# Patient Record
Sex: FEMALE | Race: WHITE | NOT HISPANIC OR LATINO | Employment: OTHER | URBAN - METROPOLITAN AREA
[De-identification: names, ages, dates, MRNs, and addresses within clinical notes are randomized per-mention and may not be internally consistent; named-entity substitution may affect disease eponyms.]

---

## 2018-02-27 ENCOUNTER — APPOINTMENT (OUTPATIENT)
Dept: RADIOLOGY | Facility: IMAGING CENTER | Age: 63
End: 2018-02-27
Attending: PHYSICIAN ASSISTANT
Payer: COMMERCIAL

## 2018-02-27 ENCOUNTER — OFFICE VISIT (OUTPATIENT)
Dept: URGENT CARE | Facility: CLINIC | Age: 63
End: 2018-02-27
Payer: COMMERCIAL

## 2018-02-27 VITALS
BODY MASS INDEX: 28.85 KG/M2 | WEIGHT: 169 LBS | DIASTOLIC BLOOD PRESSURE: 90 MMHG | TEMPERATURE: 97.8 F | OXYGEN SATURATION: 91 % | SYSTOLIC BLOOD PRESSURE: 130 MMHG | HEART RATE: 64 BPM | HEIGHT: 64 IN

## 2018-02-27 DIAGNOSIS — R05.9 COUGH: ICD-10-CM

## 2018-02-27 DIAGNOSIS — J40 BRONCHITIS: ICD-10-CM

## 2018-02-27 PROCEDURE — 99204 OFFICE O/P NEW MOD 45 MIN: CPT | Performed by: PHYSICIAN ASSISTANT

## 2018-02-27 PROCEDURE — 71046 X-RAY EXAM CHEST 2 VIEWS: CPT | Mod: TC,FY | Performed by: PHYSICIAN ASSISTANT

## 2018-02-27 RX ORDER — DOXYCYCLINE HYCLATE 100 MG/1
100 CAPSULE ORAL 2 TIMES DAILY
Qty: 14 EACH | Refills: 0 | Status: SHIPPED | OUTPATIENT
Start: 2018-02-27 | End: 2018-03-06

## 2018-02-27 RX ORDER — BENZONATATE 200 MG/1
200 CAPSULE ORAL 3 TIMES DAILY PRN
Qty: 30 CAP | Refills: 0 | Status: SHIPPED | OUTPATIENT
Start: 2018-02-27 | End: 2020-11-17

## 2018-02-27 RX ORDER — VENLAFAXINE HYDROCHLORIDE 150 MG/1
CAPSULE, EXTENDED RELEASE ORAL
COMMUNITY
Start: 2018-02-20 | End: 2020-11-17

## 2018-02-27 ASSESSMENT — ENCOUNTER SYMPTOMS
HEMOPTYSIS: 0
SHORTNESS OF BREATH: 1
SPUTUM PRODUCTION: 1
COUGH: 1
FEVER: 0
SORE THROAT: 1
PALPITATIONS: 0
WHEEZING: 1
CHILLS: 1

## 2018-02-27 ASSESSMENT — PAIN SCALES - GENERAL: PAINLEVEL: NO PAIN

## 2018-02-27 NOTE — PROGRESS NOTES
Subjective:      Dina Leblanc is a 62 y.o. female who presents with Cough (x 3 days productive); Nasal Congestion (x 3 days); and Pharyngitis            Cough   This is a new problem. Episode onset: 2 weeks ago. The problem has been unchanged. The cough is productive of sputum. Associated symptoms include chills, a sore throat, shortness of breath and wheezing. Pertinent negatives include no chest pain, ear pain, fever or hemoptysis. The symptoms are aggravated by lying down. She has tried OTC cough suppressant for the symptoms.       Review of Systems   Constitutional: Positive for chills and malaise/fatigue. Negative for fever.   HENT: Positive for congestion and sore throat. Negative for ear pain.    Respiratory: Positive for cough, sputum production, shortness of breath and wheezing. Negative for hemoptysis.    Cardiovascular: Negative for chest pain and palpitations.   All other systems reviewed and are negative.    PMH:  has no past medical history on file.  MEDS:   Current Outpatient Prescriptions:   •  progesterone (PROMETRIUM) 100 MG Cap, , Disp: , Rfl:   •  venlafaxine (EFFEXOR-XR) 150 MG extended-release capsule, , Disp: , Rfl:   •  doxycycline (VIBRAMYCIN) 100 MG Cap, Take 1 Cap by mouth 2 times a day for 7 days., Disp: 14 Each, Rfl: 0  •  benzonatate (TESSALON) 200 MG capsule, Take 1 Cap by mouth 3 times a day as needed for Cough., Disp: 30 Cap, Rfl: 0  ALLERGIES:   Allergies   Allergen Reactions   • Codeine Vomiting     Pt reported     SURGHX: History reviewed. No pertinent surgical history.  SOCHX:  reports that she quit smoking about 10 years ago. Her smoking use included Cigarettes. She quit after 10.00 years of use. She has never used smokeless tobacco. She reports that she drinks alcohol. She reports that she does not use drugs.  FH: Family history was reviewed, no pertinent findings to report  Medications, Allergies, and current problem list reviewed today in Epic       Objective:     /90    "Pulse 64   Temp 36.6 °C (97.8 °F)   Ht 1.626 m (5' 4\")   Wt 76.7 kg (169 lb)   SpO2 91%   BMI 29.01 kg/m²      Physical Exam   Constitutional: She is oriented to person, place, and time. She appears well-developed and well-nourished. She is active.  Non-toxic appearance. She does not have a sickly appearance. She does not appear ill. No distress. She is not intubated.   HENT:   Head: Normocephalic and atraumatic.   Right Ear: Hearing, tympanic membrane, external ear and ear canal normal.   Left Ear: Hearing, tympanic membrane, external ear and ear canal normal.   Nose: Nose normal.   Mouth/Throat: Uvula is midline, oropharynx is clear and moist and mucous membranes are normal.   Eyes: Conjunctivae, EOM and lids are normal.   Neck: Normal range of motion. Neck supple.   Cardiovascular: Regular rhythm, S1 normal, S2 normal and normal heart sounds.  Exam reveals no gallop and no friction rub.    No murmur heard.  Pulmonary/Chest: Effort normal. No accessory muscle usage. No apnea, no tachypnea and no bradypnea. She is not intubated. No respiratory distress. She has no decreased breath sounds. She has no wheezes. She has no rhonchi. She has no rales. She exhibits no tenderness.   Coarse lung sounds   Musculoskeletal: Normal range of motion.   Neurological: She is alert and oriented to person, place, and time.   Skin: Skin is warm and dry.   Psychiatric: She has a normal mood and affect. Her speech is normal and behavior is normal. Judgment and thought content normal.   Vitals reviewed.           2/27/2018 11:17 AM    HISTORY/REASON FOR EXAM:  Cough for one week.    TECHNIQUE/EXAM DESCRIPTION AND NUMBER OF VIEWS:  Two views of the chest.    COMPARISON: None.    FINDINGS:  There is linear atelectasis within the lung bases.  The heart is normal in size.  There is no evidence of pleural effusion.  Soft tissues and bony structures are unremarkable.     Impression       Linear atelectasis within the lung bases.      "   Assessment/Plan:     1. Bronchitis  DX-CHEST-2 VIEWS    doxycycline (VIBRAMYCIN) 100 MG Cap    benzonatate (TESSALON) 200 MG capsule     Differential diagnosis, natural history, supportive care discussed. Follow-up with primary care provider within 7-10 days, emergency room precautions discussed.  Patient and/or family appears understanding of information.

## 2018-04-30 ENCOUNTER — APPOINTMENT (OUTPATIENT)
Dept: RADIOLOGY | Facility: IMAGING CENTER | Age: 63
End: 2018-04-30
Attending: NURSE PRACTITIONER
Payer: COMMERCIAL

## 2018-04-30 ENCOUNTER — OFFICE VISIT (OUTPATIENT)
Dept: URGENT CARE | Facility: CLINIC | Age: 63
End: 2018-04-30
Payer: COMMERCIAL

## 2018-04-30 VITALS
OXYGEN SATURATION: 97 % | RESPIRATION RATE: 16 BRPM | WEIGHT: 172 LBS | BODY MASS INDEX: 29.37 KG/M2 | TEMPERATURE: 98 F | SYSTOLIC BLOOD PRESSURE: 120 MMHG | HEART RATE: 84 BPM | HEIGHT: 64 IN | DIASTOLIC BLOOD PRESSURE: 80 MMHG
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
## 2018-04-30 DIAGNOSIS — S99.929A: ICD-10-CM

## 2018-04-30 PROCEDURE — 73630 X-RAY EXAM OF FOOT: CPT | Mod: TC,FY,RT | Performed by: NURSE PRACTITIONER

## 2018-04-30 PROCEDURE — 99214 OFFICE O/P EST MOD 30 MIN: CPT | Mod: 25 | Performed by: NURSE PRACTITIONER

## 2018-04-30 PROCEDURE — 90471 IMMUNIZATION ADMIN: CPT | Performed by: NURSE PRACTITIONER

## 2018-04-30 PROCEDURE — 90715 TDAP VACCINE 7 YRS/> IM: CPT | Performed by: NURSE PRACTITIONER

## 2018-04-30 RX ORDER — DOXYCYCLINE HYCLATE 100 MG
100 TABLET ORAL 2 TIMES DAILY
Qty: 14 TAB | Refills: 0 | Status: SHIPPED | OUTPATIENT
Start: 2018-04-30 | End: 2018-05-07

## 2018-04-30 ASSESSMENT — ENCOUNTER SYMPTOMS: FEVER: 0

## 2018-04-30 NOTE — PROGRESS NOTES
"Subjective:      Dina Leblanc is a 62 y.o. female who presents with Puncture Wound (Last night tepped on nail with right foot )            Puncture Wound    Incident onset: pt reports she was in a friend's yard last night walking through tall grass when she stepped on a nail in a board. She reports the nail went completely through her shoe and into her foot, puncturing the base of her right great toe. The laceration mechanism was a nail. The pain is mild. The pain has been constant since onset. She reports no foreign bodies present. Her tetanus status is out of date.       Review of Systems   Constitutional: Negative for fever.   Musculoskeletal:        Puncture wound with nail right foot   All other systems reviewed and are negative.    No past medical history on file. No past surgical history on file.   Social History     Social History   • Marital status: Single     Spouse name: N/A   • Number of children: N/A   • Years of education: N/A     Occupational History   • Not on file.     Social History Main Topics   • Smoking status: Former Smoker     Years: 10.00     Types: Cigarettes     Quit date: 2/27/2008   • Smokeless tobacco: Never Used   • Alcohol use Yes      Comment: Occasional   • Drug use: No   • Sexual activity: Not on file     Other Topics Concern   • Not on file     Social History Narrative   • No narrative on file          Objective:     /80   Pulse 84   Temp 36.7 °C (98 °F)   Resp 16   Ht 1.626 m (5' 4\")   Wt 78 kg (172 lb)   SpO2 97%   BMI 29.52 kg/m²      Physical Exam   Constitutional: She is oriented to person, place, and time. Vital signs are normal. She appears well-developed and well-nourished.   HENT:   Head: Normocephalic and atraumatic.   Eyes: EOM are normal. Pupils are equal, round, and reactive to light.   Neck: Normal range of motion.   Cardiovascular: Normal rate and regular rhythm.    Pulmonary/Chest: Effort normal.   Musculoskeletal: Normal range of motion.        Right " foot: There is tenderness.        Feet:    Neurological: She is alert and oriented to person, place, and time.   Skin: Skin is warm and dry. Capillary refill takes less than 2 seconds.   Psychiatric: She has a normal mood and affect. Her speech is normal and behavior is normal. Thought content normal.   Vitals reviewed.         Xray: no fracture or dislocation by my read. Radiology review pending.  4/30/2018 8:29 AM    HISTORY/REASON FOR EXAM:  Right first MTP joint pain. Stepped on a nail.    TECHNIQUE/EXAM DESCRIPTION AND NUMBER OF VIEWS:  3 nonweightbearing views of the RIGHT foot.    COMPARISON:    FINDINGS: Bone mineralization is normal. There is no evidence of fracture or dislocation. Soft tissues are normal. There is a plantar calcaneal spur.   Impression       No evidence of acute fracture or dislocation.          Assessment/Plan:     1. Injury by nail, initial encounter  - DX-FOOT-COMPLETE 3+ RIGHT; Future  - Tdap =>6yo IM    2. Soft tissue injury of foot, initial encounter  - doxycycline (VIBRAMYCIN) 100 MG Tab; Take 1 Tab by mouth 2 times a day for 7 days.  Dispense: 14 Tab; Refill: 0    Discussed with patient there is no evidence of injury to her bone  Contingent antibiotic prescription given to patient to fill upon meeting criteria of guidelines discussed.   Keep wound clean and dry  Alternate tylenol and ibuprofen PRN pain  Supportive care, differential diagnoses, and indications for immediate follow-up discussed with patient.    Pathogenesis of diagnosis discussed including typical length and natural progression.      Instructed to return to  or nearest emergency department if symptoms fail to improve, for any change in condition, further concerns, or new concerning symptoms.  Patient states understanding of the plan of care and discharge instructions.

## 2020-11-17 ENCOUNTER — APPOINTMENT (OUTPATIENT)
Dept: RADIOLOGY | Facility: MEDICAL CENTER | Age: 65
End: 2020-11-17
Attending: EMERGENCY MEDICINE
Payer: MEDICAID

## 2020-11-17 ENCOUNTER — HOSPITAL ENCOUNTER (EMERGENCY)
Facility: MEDICAL CENTER | Age: 65
End: 2020-11-17
Attending: EMERGENCY MEDICINE
Payer: MEDICAID

## 2020-11-17 VITALS
SYSTOLIC BLOOD PRESSURE: 180 MMHG | WEIGHT: 190 LBS | DIASTOLIC BLOOD PRESSURE: 82 MMHG | BODY MASS INDEX: 32.44 KG/M2 | HEART RATE: 70 BPM | TEMPERATURE: 97.9 F | RESPIRATION RATE: 20 BRPM | OXYGEN SATURATION: 92 % | HEIGHT: 64 IN

## 2020-11-17 DIAGNOSIS — Z20.822 SUSPECTED COVID-19 VIRUS INFECTION: ICD-10-CM

## 2020-11-17 LAB
COVID ORDER STATUS COVID19: NORMAL
SARS-COV-2 RNA RESP QL NAA+PROBE: DETECTED
SPECIMEN SOURCE: ABNORMAL

## 2020-11-17 PROCEDURE — 700102 HCHG RX REV CODE 250 W/ 637 OVERRIDE(OP): Performed by: EMERGENCY MEDICINE

## 2020-11-17 PROCEDURE — 71045 X-RAY EXAM CHEST 1 VIEW: CPT

## 2020-11-17 PROCEDURE — A9270 NON-COVERED ITEM OR SERVICE: HCPCS | Performed by: EMERGENCY MEDICINE

## 2020-11-17 PROCEDURE — 99284 EMERGENCY DEPT VISIT MOD MDM: CPT

## 2020-11-17 PROCEDURE — U0003 INFECTIOUS AGENT DETECTION BY NUCLEIC ACID (DNA OR RNA); SEVERE ACUTE RESPIRATORY SYNDROME CORONAVIRUS 2 (SARS-COV-2) (CORONAVIRUS DISEASE [COVID-19]), AMPLIFIED PROBE TECHNIQUE, MAKING USE OF HIGH THROUGHPUT TECHNOLOGIES AS DESCRIBED BY CMS-2020-01-R: HCPCS

## 2020-11-17 RX ORDER — ACETAMINOPHEN 325 MG/1
975 TABLET ORAL ONCE
Status: COMPLETED | OUTPATIENT
Start: 2020-11-17 | End: 2020-11-17

## 2020-11-17 RX ORDER — DULOXETIN HYDROCHLORIDE 20 MG/1
20 CAPSULE, DELAYED RELEASE ORAL 2 TIMES DAILY
COMMUNITY
End: 2021-05-28

## 2020-11-17 RX ORDER — AMLODIPINE BESYLATE 5 MG/1
5 TABLET ORAL DAILY
Status: SHIPPED | COMMUNITY
End: 2022-03-17 | Stop reason: SDUPTHER

## 2020-11-17 RX ADMIN — ACETAMINOPHEN 975 MG: 325 TABLET, FILM COATED ORAL at 08:30

## 2020-11-17 NOTE — ED NOTES
Medicated per MAR,  covid swab obtained and sent.  Updated on POc.  Denies needs, call light in reach.

## 2020-11-17 NOTE — ED TRIAGE NOTES
"Chief Complaint   Patient presents with   • Body Aches   • Cough   • Sore Throat     BP (!) 176/101   Pulse 79   Temp 36.6 °C (97.8 °F) (Temporal)   Resp 18   Ht 1.626 m (5' 4\")   Wt 86.2 kg (190 lb)   SpO2 93%   Breastfeeding No   BMI 32.61 kg/m²     Pt ambulated into triage, mask in place, NAD. Pt states symptoms started yesterday. Encouraged to return to the triage nurse or tech with any new complaints or symptoms. Pt taken to separate lobby.  "

## 2020-11-17 NOTE — ED PROVIDER NOTES
ED Provider Note    CHIEF COMPLAINT  Chief Complaint   Patient presents with   • Body Aches   • Cough   • Sore Throat       HPI  Dina Leblanc is a 65 y.o. female who presents evaluation of body aches sore throat mild cough.  The patient apparently was recently exposed to her work with a COVID-19 positive individual.  She denies significant dyspnea or high fevers.  She has a history of hypertension she does not endorse chest pain.  She noted that her blood pressure was slightly high with checkup.  No associated numbness tingling weakness    REVIEW OF SYSTEMS  See HPI for further details.  Positive fevers chills body aches all other systems are negative.     PAST MEDICAL HISTORY  Past Medical History:   Diagnosis Date   • Hypertension        FAMILY HISTORY  Noncontributory    SOCIAL HISTORY  Social History     Socioeconomic History   • Marital status: Single     Spouse name: Not on file   • Number of children: Not on file   • Years of education: Not on file   • Highest education level: Not on file   Occupational History   • Not on file   Social Needs   • Financial resource strain: Not on file   • Food insecurity     Worry: Not on file     Inability: Not on file   • Transportation needs     Medical: Not on file     Non-medical: Not on file   Tobacco Use   • Smoking status: Former Smoker     Years: 10.00     Types: Cigarettes     Quit date: 2008     Years since quittin.7   • Smokeless tobacco: Never Used   Substance and Sexual Activity   • Alcohol use: Yes     Comment: Occasional   • Drug use: No   • Sexual activity: Not on file   Lifestyle   • Physical activity     Days per week: Not on file     Minutes per session: Not on file   • Stress: Not on file   Relationships   • Social connections     Talks on phone: Not on file     Gets together: Not on file     Attends [employer] service: Not on file     Active member of club or organization: Not on file     Attends meetings of clubs or organizations: Not on file      "Relationship status: Not on file   • Intimate partner violence     Fear of current or ex partner: Not on file     Emotionally abused: Not on file     Physically abused: Not on file     Forced sexual activity: Not on file   Other Topics Concern   • Not on file   Social History Narrative   • Not on file     Non-smoker  SURGICAL HISTORY  No past surgical history on file.    CURRENT MEDICATIONS  Home Medications     Reviewed by Nelsy Chacon R.N. (Registered Nurse) on 11/17/20 at 0727  Med List Status: Complete   Medication Last Dose Status   amLODIPine (NORVASC) 5 MG Tab 11/17/2020 Active   DULoxetine (CYMBALTA) 20 MG Cap DR Particles 11/17/2020 Active                ALLERGIES  Allergies   Allergen Reactions   • Codeine Vomiting     Pt reported       PHYSICAL EXAM  VITAL SIGNS: BP (!) 176/101   Pulse 79   Temp 36.6 °C (97.8 °F) (Temporal)   Resp 18   Ht 1.626 m (5' 4\")   Wt 86.2 kg (190 lb)   SpO2 93%   Breastfeeding No   BMI 32.61 kg/m²       Constitutional: Well developed, Well nourished, No acute distress, Non-toxic appearance.   HENT: Normocephalic, Atraumatic, Bilateral external ears normal, Oropharynx moist, No oral exudates, Nose normal.   Eyes: PERRLA, EOMI, Conjunctiva normal, No discharge.   Neck: Normal range of motion, No tenderness, Supple, No stridor.   Cardiovascular: Normal heart rate, Normal rhythm, No murmurs, No rubs, No gallops.   Thorax & Lungs: Minimal scattered rhonchi, No chest tenderness.   Abdomen: Bowel sounds normal, Soft, No tenderness, No masses, No pulsatile masses.   Skin: Warm, Dry, No erythema, No rash.   Back: No tenderness, No CVA tenderness.   Extremities: Intact distal pulses, No edema, No tenderness, No cyanosis, No clubbing.   Musculoskeletal: Good range of motion in all major joints. No tenderness to palpation or major deformities noted.   Neurologic: Alert & oriented x 3, Normal motor function, Normal sensory function, No focal deficits noted.   Psychiatric: Affect " normal, Judgment normal, Mood normal.         RADIOLOGY/PROCEDURES  DX-CHEST-PORTABLE (1 VIEW)   Final Result      Mild cardiomegaly.          COURSE & MEDICAL DECISION MAKING  Pertinent Labs & Imaging studies reviewed. (See chart for details)  Patient presents here with recent COVID-19 exposure and symptoms including low-grade fever cough runny nose congestion.  There is high suspicion for possible COVID-19.  She has no hypoxia or increased work of breathing.  Chest x-ray demonstrates mild cardiomegaly but no overt heart failure or suggestion of severe Covid pneumonitis.  We will swab her and I counseled her to quarantine at home until her results are available    FINAL IMPRESSION  1.  Suspected COVID-19  2.  Hypertension      Electronically signed by: Seth Ledesma M.D., 11/17/2020 8:10 AM

## 2020-11-17 NOTE — ED NOTES
VSS.  Discharge instructions- verbalizes understanding.   Ambulatory to lobby with steady gait.

## 2021-05-28 PROBLEM — J31.0 CHRONIC RHINITIS: Status: ACTIVE | Noted: 2021-05-28

## 2021-05-28 PROBLEM — J45.909 ASTHMA WITHOUT STATUS ASTHMATICUS: Status: ACTIVE | Noted: 2021-05-28

## 2021-05-28 PROBLEM — F41.9 ANXIETY: Status: ACTIVE | Noted: 2021-05-28

## 2021-05-28 PROBLEM — E66.9 CLASS 1 OBESITY: Status: ACTIVE | Noted: 2021-05-28

## 2021-05-28 PROBLEM — J44.9 CHRONIC OBSTRUCTIVE PULMONARY DISEASE (HCC): Status: ACTIVE | Noted: 2021-05-28

## 2023-12-20 PROBLEM — R06.02 SOB (SHORTNESS OF BREATH): Status: ACTIVE | Noted: 2023-09-01

## 2023-12-20 PROBLEM — I25.10 ARTERIOSCLEROSIS OF CORONARY ARTERY: Status: ACTIVE | Noted: 2023-11-20

## 2023-12-20 PROBLEM — I10 ESSENTIAL HYPERTENSION: Status: ACTIVE | Noted: 2023-09-01
